# Patient Record
(demographics unavailable — no encounter records)

---

## 2024-10-08 NOTE — HISTORY OF PRESENT ILLNESS
[Mother] : mother [whole ___ oz/d] : consumes [unfilled] oz of whole cow's milk per day [Sugar drinks] : sugar drinks [Fruit] : fruit [Vegetables] : vegetables [Meat] : meat [Grains] : grains [Eggs] : eggs [Fish] : fish [Dairy] : dairy [Normal] : Normal [Brushing teeth] : Brushing teeth [Toothpaste] : Primary Fluoride Source: Toothpaste [Appropiate parent-child communication] : Appropriate parent-child communication [Parent has appropriate responses to behavior] : Parent has appropriate responses to behavior [Yes] : Cigarette smoke exposure [No] : Not at  exposure [Water heater temperature set at <120 degrees F] : Water heater temperature set at <120 degrees F [Car seat in back seat] : Car seat in back seat [Smoke Detectors] : Smoke detectors [Supervised play near cars and streets] : Supervised play near cars and streets [Carbon Monoxide Detectors] : Carbon monoxide detectors [___ stools every other day] : [unfilled]  stools every other day [Exposure to electronic nicotine delivery system] : No exposure to electronic nicotine delivery system [de-identified] : water [FreeTextEntry8] : pooping more, constipation improving, no blood in stool  [FreeTextEntry3] : co-sleep  [FreeTextEntry1] : no meds, no allergies   snoring

## 2024-10-08 NOTE — DEVELOPMENTAL MILESTONES
[Plays and shares with others] : plays and shares with others [Begins to play make-believe] : begins to play make-believe [Eats independently] : eats independently [Understands simple prepositions] : understands simple prepositions [Compares things using words such] : compares things using words such as bigger or shorter [Climbs on and off couch] : climbs on and off couch or chair [Jumps forward] : jumps forward [Draws a single [place]] : draws a single [place] [employer] [Goes to the bathroom and urinates] : does not go to bathroom and urinates by self [Put on coat, jacket, or shirt by self] : does not put on coat, jacket, or shirt by self [Uses 3-word sentences] : does not use 3-word sentences [Uses words that are 75% intelligible] : does not use words that are 75% intelligible to strangers [Tells a story from a book or TV] : does not tell a story from a book or TV [Pedals tricycle] : does not pedal tricycle [Draws a person with head] : does not draw a person with head and one other body part [Cuts with child scissor] : does not cut with child scissor

## 2024-10-08 NOTE — HISTORY OF PRESENT ILLNESS
[Mother] : mother [whole ___ oz/d] : consumes [unfilled] oz of whole cow's milk per day [Sugar drinks] : sugar drinks [Fruit] : fruit [Vegetables] : vegetables [Meat] : meat [Grains] : grains [Eggs] : eggs [Fish] : fish [Dairy] : dairy [Normal] : Normal [Brushing teeth] : Brushing teeth [Toothpaste] : Primary Fluoride Source: Toothpaste [Appropiate parent-child communication] : Appropriate parent-child communication [Parent has appropriate responses to behavior] : Parent has appropriate responses to behavior [Yes] : Cigarette smoke exposure [No] : Not at  exposure [Water heater temperature set at <120 degrees F] : Water heater temperature set at <120 degrees F [Car seat in back seat] : Car seat in back seat [Smoke Detectors] : Smoke detectors [Supervised play near cars and streets] : Supervised play near cars and streets [Carbon Monoxide Detectors] : Carbon monoxide detectors [___ stools every other day] : [unfilled]  stools every other day [Exposure to electronic nicotine delivery system] : No exposure to electronic nicotine delivery system [de-identified] : water [FreeTextEntry8] : pooping more, constipation improving, no blood in stool  [FreeTextEntry3] : co-sleep  [FreeTextEntry1] : no meds, no allergies   snoring

## 2024-10-08 NOTE — PHYSICAL EXAM

## 2024-11-30 NOTE — DISCUSSION/SUMMARY
[FreeTextEntry1] : Recommend supportive care including fluids, and nasal saline followed by nasal suction. Return if symptoms worsen or persist. May use albuterol neb treatment every 4-6 hours as needed for wheezing.  If there are retractions, nasal flaring, belly breathing, tachypnea, dyspnea, SOB report to ED immediately.

## 2024-11-30 NOTE — HISTORY OF PRESENT ILLNESS
[EENT/Resp Symptoms] : EENT/RESPIRATORY SYMPTOMS [Runny nose] : runny nose [Nasal congestion] : nasal congestion [Cough] : cough [___ Week(s)] : [unfilled] week(s) [Active] : active [Wet cough] : wet cough [Known Exposure to COVID-19] : no known exposure to COVID-19 [Hx of recent COVID-19 infection] : no history of recent COVID-19 infection [Clear rhinorrhea] : clear rhinorrhea [Fever] : no fever [Shortness of Breath] : no shortness of breath [Tachypnea] : no tachypnea [Decreased Appetite] : no decreased appetite [Posttussive emesis] : no posttussive emesis [Vomiting] : no vomiting [Diarrhea] : no diarrhea [Decreased Urine Output] : no decreased urine output [Stable] : stable [de-identified] : Taken to urgent care 5 days ago, dx with RSV infection.

## 2024-11-30 NOTE — HISTORY OF PRESENT ILLNESS
[EENT/Resp Symptoms] : EENT/RESPIRATORY SYMPTOMS [Runny nose] : runny nose [Nasal congestion] : nasal congestion [Cough] : cough [___ Week(s)] : [unfilled] week(s) [Active] : active [Wet cough] : wet cough [Known Exposure to COVID-19] : no known exposure to COVID-19 [Hx of recent COVID-19 infection] : no history of recent COVID-19 infection [Clear rhinorrhea] : clear rhinorrhea [Fever] : no fever [Shortness of Breath] : no shortness of breath [Tachypnea] : no tachypnea [Decreased Appetite] : no decreased appetite [Posttussive emesis] : no posttussive emesis [Vomiting] : no vomiting [Diarrhea] : no diarrhea [Decreased Urine Output] : no decreased urine output [Stable] : stable [de-identified] : Taken to urgent care 5 days ago, dx with RSV infection.

## 2025-01-07 NOTE — HISTORY OF PRESENT ILLNESS
[de-identified] : follow up  [FreeTextEntry6] : cough and congestion states that is doing better overall no fever eating well no distress

## 2025-05-19 NOTE — PHYSICAL EXAM
[NL] : moves all extremities x4, warm, well perfused x4 [Excoriated] : excoriated [Lenticular] : lenticular [de-identified] : raised

## 2025-05-19 NOTE — HISTORY OF PRESENT ILLNESS
[de-identified] : rash on lower leg [FreeTextEntry6] : worsening of rash redness no fever no distress  states that the rash is getting bigger